# Patient Record
Sex: FEMALE | Race: NATIVE HAWAIIAN OR OTHER PACIFIC ISLANDER | Employment: PART TIME | URBAN - METROPOLITAN AREA
[De-identification: names, ages, dates, MRNs, and addresses within clinical notes are randomized per-mention and may not be internally consistent; named-entity substitution may affect disease eponyms.]

---

## 2018-09-17 ENCOUNTER — OFFICE VISIT (OUTPATIENT)
Dept: FAMILY MEDICINE CLINIC | Age: 43
End: 2018-09-17

## 2018-09-17 VITALS
TEMPERATURE: 98.2 F | WEIGHT: 155 LBS | BODY MASS INDEX: 27.46 KG/M2 | SYSTOLIC BLOOD PRESSURE: 106 MMHG | OXYGEN SATURATION: 99 % | HEIGHT: 63 IN | HEART RATE: 61 BPM | RESPIRATION RATE: 16 BRPM | DIASTOLIC BLOOD PRESSURE: 78 MMHG

## 2018-09-17 DIAGNOSIS — R71.8 ELEVATED MCV: ICD-10-CM

## 2018-09-17 DIAGNOSIS — Z23 ENCOUNTER FOR IMMUNIZATION: ICD-10-CM

## 2018-09-17 DIAGNOSIS — R53.83 FATIGUE, UNSPECIFIED TYPE: ICD-10-CM

## 2018-09-17 DIAGNOSIS — Z13.1 SCREENING FOR DIABETES MELLITUS (DM): ICD-10-CM

## 2018-09-17 DIAGNOSIS — R07.9 CHEST PAIN, UNSPECIFIED TYPE: ICD-10-CM

## 2018-09-17 DIAGNOSIS — Z13.220 LIPID SCREENING: ICD-10-CM

## 2018-09-17 DIAGNOSIS — Z00.00 WELL ADULT EXAM: Primary | ICD-10-CM

## 2018-09-17 DIAGNOSIS — N92.6 IRREGULAR MENSES: ICD-10-CM

## 2018-09-17 DIAGNOSIS — E55.9 VITAMIN D DEFICIENCY: ICD-10-CM

## 2018-09-17 NOTE — PROGRESS NOTES
Subjective:  
39 y.o. female for Well Woman Check. Her gyne and breast care is done elsewhere by her Ob-Gyne physician. Dr. Carlos Lewis Past Medical History:  
Diagnosis Date  Abnormal Pap smear of cervix  Essential hypertension  Headache Past Surgical History:  
Procedure Laterality Date   DELIVERY ONLY    
 HX CYST REMOVAL Right 2003  
 right wrist ganglion cyst  
 
Family History Problem Relation Age of Onset  Hypertension Mother Shaye Arthritis-osteo Mother  Diabetes Mother  Heart Attack Mother  Diabetes Father  Hypertension Father Social History Substance Use Topics  Smoking status: Never Smoker  Smokeless tobacco: Never Used  Alcohol use Yes Comment: occ ROS: Feeling generally well. No TIA's or unusual headaches, no dysphagia. No prolonged cough. No dyspnea or chest pain on exertion. No abdominal pain, change in bowel habits, black or bloody stools. No urinary tract symptoms. No new or unusual musculoskeletal symptoms. Objective: The patient appears well, alert, oriented x 3, in no distress. Visit Vitals  /78 (BP 1 Location: Left arm, BP Patient Position: Sitting)  Pulse 61  Temp 98.2 °F (36.8 °C) (Oral)  Resp 16  
 Ht 5' 3\" (1.6 m)  Wt 155 lb (70.3 kg)  LMP 2018 (Approximate)  SpO2 99%  BMI 27.46 kg/m2 ENT normal.  Neck supple. No adenopathy or thyromegaly. FATOU. Lungs are clear, good air entry, no wheezes, rhonchi or rales. S1 and S2 normal, no murmurs, regular rate and rhythm. Abdomen soft without tenderness, guarding, mass or organomegaly. Extremities show no edema, normal peripheral pulses. Neurological is normal, no focal findings. Breast and Pelvic exams are deferred. Assessment/Plan:  
Diagnoses and all orders for this visit: 
 
Well adult exam 
Well Woman 
lose weight, routine labs ordered, have labs drawn prior to ROV, call if any problems reviewed diet, exercise and weight control Pap smear and mammogram per pt normal w/ dr Benny Kapadia Tdap/flu vaccines today Lipid screening -     LIPID PANEL; Future Screening for diabetes mellitus (DM) 
-     METABOLIC PANEL, COMPREHENSIVE; Future 
-     HEMOGLOBIN A1C W/O EAG; Future Encounter for immunization -     Influenza virus vaccine (QUADRIVALENT PRES FREE SYRINGE) IM ([id number]) -     Tetanus, diphtheria toxoids and acellular pertussis (TDAP) vaccine, in individuals >=7 years, IM Alejandro Germainfast, 39 y.o.,  female SUBJECTIVE Few concerns Fatigue- past month, felt more exhausted than usual. Says no energy throughout the day. Reports increased stress at that time as well. She says had blood test done with gyne, CBC showing mildly elevated MCV. She has h/o vit d def. Chest pain- past month as well. Reports L sided chest pain at rest.  She does cardio, treadmill and rowing without symptoms. She reports mom and brother with history of CAD/ heart attacks. She denies sob, diaphoresis, lightheadedness, heartburn, cough, palpitations. Wants to have her hormones checked, had irregular bleeding past few months. She is seeing gyne, had endometrial bx done. She denies hot flashes, vaginal dryness. ROS: 
See HPI, all others negative Patient Active Problem List  
Diagnosis Code  Advanced directives, counseling/discussion Z71.89  Vitamin D deficiency E55.9 Current Outpatient Prescriptions Medication Sig Dispense Refill  traZODone (DESYREL) 50 mg tablet Take 1 Tab by mouth nightly. 30 Tab 0 Allergies Allergen Reactions  Bactrim [Sulfamethoprim Ds] Hives  Motrin [Ibuprofen] Hives Past Medical History:  
Diagnosis Date  Abnormal Pap smear of cervix  Essential hypertension  Headache Social History Social History  Marital status:    Spouse name: N/A  
 Number of children: N/A  
 Years of education: N/A  
 
 Occupational History  student Social History Main Topics  Smoking status: Never Smoker  Smokeless tobacco: Never Used  Alcohol use Yes Comment: occ  Drug use: No  
 Sexual activity: Yes  
  Partners: Male Birth control/ protection: None Other Topics Concern  Not on file Social History Narrative Family History Problem Relation Age of Onset  Hypertension Mother [facility] Arthritis-osteo Mother  Diabetes Mother  Heart Attack Mother  Diabetes Father  Hypertension Father OBJECTIVE Physical Exam:  
 
Visit Vitals  /78 (BP 1 Location: Left arm, BP Patient Position: Sitting)  Pulse 61  Temp 98.2 °F (36.8 °C) (Oral)  Resp 16  
 Ht 5' 3\" (1.6 m)  Wt 155 lb (70.3 kg)  LMP 09/03/2018 (Approximate)  SpO2 99%  BMI 27.46 kg/m2 General: alert, well-appearing,  in no apparent distress or pain Head: atraumatic. Non-tender maxillary and frontal sinuses Eyes: Lids with no discharge, no matting, conjunctivae clear and non injected, full EOMs, PERLLA Ears: pinna non-tender, external auditory canal patent, TM intact Mouth/throat:tonsils non enlarged, pharynx non erythematous and no lesion, nasal mucosa normal 
Neck: supple, no adenopathy palpated CVS: normal rate, regular rhythm, distinct S1 and S2, +MTTP on left chest area Lungs:clear to ausculation bilaterally, no crackles, wheezing or rhonchi noted Abdomen: normoactive bowel sounds, soft, non-tender Extremities: no edema, no cyanosis, MSK grossly normal 
Skin: warm, no lesions, rashes noted Psych:  mood and affect normal 
 
 
 
ASSESSMENT/PLAN Diagnoses and all orders for this visit: 
 
 Fatigue, unspecified type Nonspecific Normal CBC reviewed, elevated MCV 
-     VITAMIN D, 25 HYDROXY; Future -     VITAMIN B12 & FOLATE; Future -     METABOLIC PANEL, COMPREHENSIVE; Future 
-     TSH 3RD GENERATION; Future Vitamin D deficiency -     VITAMIN D, 25 HYDROXY; Future Elevated MCV 
-     VITAMIN D, 25 HYDROXY; Future -     VITAMIN B12 & FOLATE; Future Lipid screening -     LIPID PANEL; Future Screening for diabetes mellitus (DM) 
-     METABOLIC PANEL, COMPREHENSIVE; Future 
-     HEMOGLOBIN A1C W/O EAG; Future Chest pain, unspecified type Likely atypical, however with strong fam h/o CAD Check cardiac stress test 
-     XR CHEST PA LAT; Future 
-     EKG, 12 LEAD, INITIAL; Future -     EXERCISE CARDIAC STRESS TEST; Future Irregular menses 
-     [facility] AND ; Future Encounter for immunization -     Influenza virus vaccine (QUADRIVALENT PRES FREE SYRINGE) IM ([id number]) -     Tetanus, diphtheria toxoids and acellular pertussis (TDAP) vaccine, in individuals >=7 years, IM Follow-up Disposition: 
Return in about 2 weeks (around 10/1/2018), or if symptoms worsen or fail to improve. Patient understands plan of care. Patient has provided input and agrees with goals.

## 2018-09-17 NOTE — PROGRESS NOTES
1. Have you been to the ER, urgent care clinic since your last visit? Hospitalized since your last visit? No 
 
2. Have you seen or consulted any other health care providers outside of the [address] [place] [address] since your last visit? Include any pap smears or colon screening. Dr. Jaimee Encarnacion Tdap 0.5 ml given IM in left deltoid. Lot # [id number], exp date 02/07/2021. Patient tolerated injection well. No adverse reaction noted. Flulaval 0.5 ml given IM in right deltoid. Lot # [id number], exp date 06/12/2019. Patient tolerated injection well. No adverse reaction noted.

## 2018-09-17 NOTE — PATIENT INSTRUCTIONS

## 2018-09-17 NOTE — MR AVS SNAPSHOT
[facility] [address] 
[phone] Patient: Susu Peraza MRN: [id number] SWA:[id number] Visit Information Date & Time Provider Department Dept. Phone Encounter #  
 9/17/2018  1:00 PM Kimo Roman, [address] [id number] Follow-up Instructions Return in about 2 weeks (around 10/1/2018), or if symptoms worsen or fail to improve. Upcoming Health Maintenance Date Due DTaP/Tdap/Td series (1 - Tdap) 4/15/1995 Influenza Age 5 to Adult 8/1/2018 PAP AKA CERVICAL CYTOLOGY 8/22/2018 Allergies as of 9/17/2018  Review Complete On: 9/17/2018 By: Kb Machado LPN Severity Noted Reaction Type Reactions Bactrim [Sulfamethoprim Ds]  11/04/2011    Hives Motrin [Ibuprofen]  11/04/2011    Hives Current Immunizations  Never Reviewed No immunizations on file. Not reviewed this visit You Were Diagnosed With   
  
 Codes Comments Well adult exam    -  Primary ICD-10-CM: Z00.00 ICD-9-CM: V70.0 Fatigue, unspecified type     ICD-10-CM: R53.83 ICD-9-CM: 780.79 Vitamin D deficiency     ICD-10-CM: E55.9 ICD-9-CM: 268.9 Elevated MCV     ICD-10-CM: R71.8 ICD-9-CM: 790.09 Lipid screening     ICD-10-CM: N96.912 ICD-9-CM: V77.91 Screening for diabetes mellitus (DM)     ICD-10-CM: Z13.1 ICD-9-CM: V77.1 Chest pain, unspecified type     ICD-10-CM: R07.9 ICD-9-CM: 786.50 Irregular menses     ICD-10-CM: N92.6 ICD-9-CM: 626.4 Vitals BP Pulse Temp Resp Height(growth percentile) Weight(growth percentile) 106/78 (BP 1 Location: Left arm, BP Patient Position: Sitting) 61 98.2 °F (36.8 °C) (Oral) 16 5' 3\" (1.6 m) 155 lb (70.3 kg) LMP SpO2 BMI OB Status Smoking Status 09/03/2018 (Approximate) 99% 27.46 kg/m2 Having regular periods Never Smoker Vitals History BMI and BSA Data Body Mass Index Body Surface Area  
 27.46 kg/m 2 1.77 m 2 Preferred Pharmacy Pharmacy Name Phone [facility] [address], [address] [phone] Your Updated Medication List  
  
   
This list is accurate as of 9/17/18  1:59 PM.  Always use your most recent med list.  
  
  
  
  
 traZODone 50 mg tablet Commonly known as:  Barbyoel Frederickse Take 1 Tab by mouth nightly. Follow-up Instructions Return in about 2 weeks (around 10/1/2018), or if symptoms worsen or fail to improve. To-Do List   
 09/17/2018 ECG:  EKG, 12 LEAD, INITIAL   
  
 09/17/2018 NM Stress:  EXERCISE CARDIAC STRESS TEST   
  
 09/17/2018 Lab:  [facility] AND [address]   
  
 09/17/2018 Lab:  HEMOGLOBIN A1C W/O EAG   
  
 09/17/2018 Lab:  LIPID PANEL   
  
 09/17/2018 Lab:  METABOLIC PANEL, COMPREHENSIVE   
  
 09/17/2018 Lab:  TSH 3RD GENERATION   
  
 09/17/2018 Lab:  VITAMIN B12 & FOLATE   
  
 09/17/2018 Lab:  VITAMIN D, 25 HYDROXY   
  
 09/17/2018 Imaging:  XR CHEST PA LAT Patient Instructions Heart-Healthy Diet: Care Instructions Your Care Instructions A heart-healthy diet has lots of vegetables, fruits, nuts, beans, and whole grains, and is low in salt. It limits foods that are high in saturated fat, such as meats, cheeses, and fried foods. It may be hard to change your diet, but even small changes can lower your risk of heart attack and heart disease. Follow-up care is a key part of your treatment and safety. Be sure to make and go to all appointments, and call your doctor if you are having problems. It's also a good idea to know your test results and keep a list of the medicines you take. How can you care for yourself at home? Watch your portions · Learn what a serving is. A \"serving\" and a \"portion\" are not always the same thing.  Make sure that you are not eating larger portions than are recommended. For example, a serving of pasta is ½ cup. A serving size of meat is 2 to 3 ounces. A 3-ounce serving is about the size of a deck of cards. Measure serving sizes until you are good at [facility]" them. Keep in mind that restaurants often serve portions that are 2 or 3 times the size of one serving. · To keep your energy level up and keep you from feeling hungry, eat often but in smaller portions. · Eat only the number of calories you need to stay at a healthy weight. If you need to lose weight, eat fewer calories than your body burns (through exercise and other physical activity). Eat more fruits and vegetables · Eat a variety of fruit and vegetables every day. Dark green, deep orange, red, or yellow fruits and vegetables are especially good for you. Examples include spinach, carrots, peaches, and berries. · Keep carrots, celery, and other veggies handy for snacks. Buy fruit that is in season and store it where you can see it so that you will be tempted to eat it. · Cook dishes that have a lot of veggies in them, such as stir-fries and soups. Limit saturated and trans fat · Read food labels, and try to avoid saturated and trans fats. They increase your risk of heart disease. Trans fat is found in many processed foods such as cookies and crackers. · Use olive or canola oil when you cook. Try cholesterol-lowering spreads, such as Benecol or Take Control. · Bake, broil, grill, or steam foods instead of frying them. · Choose lean meats instead of high-fat meats such as hot dogs and sausages. Cut off all visible fat when you prepare meat. · Eat fish, skinless poultry, and meat alternatives such as soy products instead of high-fat meats. Soy products, such as tofu, may be especially good for your heart. · Choose low-fat or fat-free milk and dairy products. Eat fish · Eat at least two servings of fish a week.  Certain fish, such as salmon and tuna, contain omega-3 fatty acids, which may help reduce your risk of heart attack. Eat foods high in fiber · Eat a variety of grain products every day. Include whole-grain foods that have lots of fiber and nutrients. Examples of whole-grain foods include oats, whole wheat bread, and brown rice. · Buy whole-grain breads and cereals, instead of white bread or pastries. Limit salt and sodium · Limit how much salt and sodium you eat to help lower your blood pressure. · Taste food before you salt it. Add only a little salt when you think you need it. With time, your taste buds will adjust to less salt. · Eat fewer snack items, fast foods, and other high-salt, processed foods. Check food labels for the amount of sodium in packaged foods. · Choose low-sodium versions of canned goods (such as soups, vegetables, and beans). Limit sugar · Limit drinks and foods with added sugar. These include candy, desserts, and soda pop. Limit alcohol · Limit alcohol to no more than 2 drinks a day for men and 1 drink a day for women. Too much alcohol can cause health problems. When should you call for help? Watch closely for changes in your health, and be sure to contact your doctor if: 
  · You would like help planning heart-healthy meals. Where can you learn more? Go to http://brandy-hero.info/. Enter V137 in the search box to learn more about \"Heart-Healthy Diet: Care Instructions. \" Current as of: December 6, 2017 Content Version: 11.7 © 9319-5811 [employer] [place]. Care instructions adapted under license by [employer] (which disclaims liability or warranty for this information). If you have questions about a medical condition or this instruction, always ask your healthcare professional. [place] [address] any warranty or liability for your use of this information. Introducing [facility]! [employer] introduces SafeStore patient portal. Now you can access parts of your medical record, email your doctor's office, and request medication refills online. 1. In your internet browser, go to [email or web address] 2. Click on the First Time User? Click Here link in the Sign In box. You will see the New Member Sign Up page. 3. Enter your SafeStore Access Code exactly as it appears below. You will not need to use this code after youve completed the sign-up process. If you do not sign up before the expiration date, you must request a new code. · SafeStore Access Code: [email or web address] Expires: 12/10/2018  3:09 PM 
 
4. Enter the last four digits of your Social Security Number (xxxx) and Date of Birth (mm/dd/yyyy) as indicated and click Submit. You will be taken to the next sign-up page. 5. Create a SafeStore ID. This will be your SafeStore login ID and cannot be changed, so think of one that is secure and easy to remember. 6. Create a SafeStore password. You can change your password at any time. 7. Enter your Password Reset Question and Answer. This can be used at a later time if you forget your password. 8. Enter your e-mail address. You will receive e-mail notification when new information is available in [address] 9. Click Sign Up. You can now view and download portions of your medical record. 10. Click the Download Summary menu link to download a portable copy of your medical information. If you have questions, please visit the Frequently Asked Questions section of the SafeStore website. Remember, SafeStore is NOT to be used for urgent needs. For medical emergencies, dial 911. Now available from your iPhone and Android! Please provide this summary of care documentation to your next provider. Your primary care clinician is listed as Ronnie Ma. If you have any questions after today's visit, please call [phone].

## 2018-09-18 ENCOUNTER — HOSPITAL ENCOUNTER (OUTPATIENT)
Dept: GENERAL RADIOLOGY | Age: 43
Discharge: HOME | End: 2018-09-18
Payer: COMMERCIAL

## 2018-09-18 ENCOUNTER — HOSPITAL ENCOUNTER (OUTPATIENT)
Dept: LAB | Age: 43
Discharge: HOME | End: 2018-09-18
Payer: COMMERCIAL

## 2018-09-18 DIAGNOSIS — R07.9 CHEST PAIN, UNSPECIFIED TYPE: ICD-10-CM

## 2018-09-18 DIAGNOSIS — N92.6 IRREGULAR MENSES: ICD-10-CM

## 2018-09-18 DIAGNOSIS — Z13.220 LIPID SCREENING: ICD-10-CM

## 2018-09-18 DIAGNOSIS — Z13.1 SCREENING FOR DIABETES MELLITUS (DM): ICD-10-CM

## 2018-09-18 DIAGNOSIS — R71.8 ELEVATED MCV: ICD-10-CM

## 2018-09-18 DIAGNOSIS — R53.83 FATIGUE, UNSPECIFIED TYPE: ICD-10-CM

## 2018-09-18 DIAGNOSIS — E55.9 VITAMIN D DEFICIENCY: ICD-10-CM

## 2018-09-18 LAB
25(OH)D3 SERPL-MCNC: 14.8 NG/ML (ref 30–100)
ALBUMIN SERPL-MCNC: 3.8 G/DL (ref 3.4–5)
ALBUMIN/GLOB SERPL: 1.1 {RATIO} (ref 0.8–1.7)
ALP SERPL-CCNC: 66 U/L (ref 45–117)
ALT SERPL-CCNC: 31 U/L (ref 13–56)
ANION GAP SERPL CALC-SCNC: 6 MMOL/L (ref 3–18)
AST SERPL-CCNC: 27 U/L (ref 15–37)
ATRIAL RATE: 50 BPM
BILIRUB SERPL-MCNC: 0.9 MG/DL (ref 0.2–1)
BUN SERPL-MCNC: 13 MG/DL (ref 7–18)
BUN/CREAT SERPL: 13 (ref 12–20)
CALCIUM SERPL-MCNC: 8.4 MG/DL (ref 8.5–10.1)
CALCULATED P AXIS, ECG09: 55 DEGREES
CALCULATED R AXIS, ECG10: 40 DEGREES
CALCULATED T AXIS, ECG11: 11 DEGREES
CHLORIDE SERPL-SCNC: 105 MMOL/L (ref 100–108)
CHOLEST SERPL-MCNC: 162 MG/DL
CO2 SERPL-SCNC: 28 MMOL/L (ref 21–32)
CREAT SERPL-MCNC: 0.97 MG/DL (ref 0.6–1.3)
DIAGNOSIS, 93000: NORMAL
FOLATE SERPL-MCNC: >20 NG/ML (ref 3.1–17.5)
GLOBULIN SER CALC-MCNC: 3.6 G/DL (ref 2–4)
GLUCOSE SERPL-MCNC: 92 MG/DL (ref 74–99)
HBA1C MFR BLD: 5.4 % (ref 4.2–5.6)
HDLC SERPL-MCNC: 56 MG/DL (ref 40–60)
HDLC SERPL: 2.9 {RATIO} (ref 0–5)
LDLC SERPL CALC-MCNC: 86.4 MG/DL (ref 0–100)
LIPID PROFILE,FLP: NORMAL
P-R INTERVAL, ECG05: 190 MS
POTASSIUM SERPL-SCNC: 4 MMOL/L (ref 3.5–5.5)
PROT SERPL-MCNC: 7.4 G/DL (ref 6.4–8.2)
Q-T INTERVAL, ECG07: 436 MS
QRS DURATION, ECG06: 90 MS
QTC CALCULATION (BEZET), ECG08: 397 MS
SODIUM SERPL-SCNC: 139 MMOL/L (ref 136–145)
TRIGL SERPL-MCNC: 98 MG/DL (ref ?–150)
TSH SERPL DL<=0.05 MIU/L-ACNC: 1.11 UIU/ML (ref 0.36–3.74)
VENTRICULAR RATE, ECG03: 50 BPM
VIT B12 SERPL-MCNC: 555 PG/ML (ref 211–911)
VLDLC SERPL CALC-MCNC: 19.6 MG/DL

## 2018-09-18 PROCEDURE — 80061 LIPID PANEL: CPT | Performed by: FAMILY MEDICINE

## 2018-09-18 PROCEDURE — 80053 COMPREHEN METABOLIC PANEL: CPT | Performed by: FAMILY MEDICINE

## 2018-09-18 PROCEDURE — 83036 HEMOGLOBIN GLYCOSYLATED A1C: CPT | Performed by: FAMILY MEDICINE

## 2018-09-18 PROCEDURE — 71046 X-RAY EXAM CHEST 2 VIEWS: CPT

## 2018-09-18 PROCEDURE — 84443 ASSAY THYROID STIM HORMONE: CPT | Performed by: FAMILY MEDICINE

## 2018-09-18 PROCEDURE — 36415 COLL VENOUS BLD VENIPUNCTURE: CPT | Performed by: FAMILY MEDICINE

## 2018-09-18 PROCEDURE — 93005 ELECTROCARDIOGRAM TRACING: CPT

## 2018-09-18 PROCEDURE — 82306 VITAMIN D 25 HYDROXY: CPT | Performed by: FAMILY MEDICINE

## 2018-09-18 PROCEDURE — 82607 VITAMIN B-12: CPT | Performed by: FAMILY MEDICINE

## 2018-09-18 PROCEDURE — 83001 ASSAY OF GONADOTROPIN (FSH): CPT | Performed by: FAMILY MEDICINE

## 2018-09-18 NOTE — PROGRESS NOTES
Contacted patient and verified identity using name and date of birth (2- identifiers). Patient advised normal cxr. Patient voiced understanding.

## 2018-09-19 ENCOUNTER — TELEPHONE (OUTPATIENT)
Dept: FAMILY MEDICINE CLINIC | Age: 43
End: 2018-09-19

## 2018-09-20 LAB
FSH SERPL-ACNC: 2.7 MIU/ML
LH SERPL-ACNC: 1.9 MIU/ML

## 2018-09-24 ENCOUNTER — HOSPITAL ENCOUNTER (OUTPATIENT)
Dept: NON INVASIVE DIAGNOSTICS | Age: 43
Discharge: HOME | End: 2018-09-24
Attending: FAMILY MEDICINE
Payer: COMMERCIAL

## 2018-09-24 VITALS
BODY MASS INDEX: 27.29 KG/M2 | WEIGHT: 154 LBS | HEIGHT: 63 IN | DIASTOLIC BLOOD PRESSURE: 84 MMHG | SYSTOLIC BLOOD PRESSURE: 142 MMHG

## 2018-09-24 DIAGNOSIS — R07.9 CHEST PAIN, UNSPECIFIED TYPE: ICD-10-CM

## 2018-09-24 LAB
STRESS ANGINA INDEX: 0
STRESS BASELINE DIAS BP: 84 MMHG
STRESS BASELINE HR: 72 BPM
STRESS BASELINE SYS BP: 142 MMHG
STRESS ESTIMATED WORKLOAD: 14.4 METS
STRESS EXERCISE DUR MIN: NORMAL
STRESS PEAK DIAS BP: 80 MMHG
STRESS PEAK SYS BP: 188 MMHG
STRESS PERCENT HR ACHIEVED: 102 %
STRESS POST PEAK HR: 153 BPM
STRESS RATE PRESSURE PRODUCT: NORMAL BPM*MMHG
STRESS ST DEPRESSION: 0 MM
STRESS ST ELEVATION: 0 MM
STRESS STAGE 1 BP: NORMAL MMHG
STRESS STAGE 1 DURATION: 3 MIN:SEC
STRESS STAGE 1 HR: 93 BPM
STRESS STAGE 2 BP: NORMAL MMHG
STRESS STAGE 2 DURATION: 3 MIN:SEC
STRESS STAGE 2 HR: 106 BPM
STRESS STAGE 3 BP: NORMAL MMHG
STRESS STAGE 3 DURATION: 3 MIN:SEC
STRESS STAGE 3 HR: 122 BPM
STRESS STAGE 4 DURATION: 3 MIN:SEC
STRESS STAGE 4 HR: 151 BPM
STRESS STAGE 5 DURATION: NORMAL MIN:SEC
STRESS STAGE 5 HR: 153 BPM
STRESS STAGE RECOVERY 1 BP: NORMAL MMHG
STRESS STAGE RECOVERY 1 DURATION: 2 MIN:SEC
STRESS STAGE RECOVERY 1 HR: 103 BPM
STRESS STAGE RECOVERY 2 BP: NORMAL MMHG
STRESS STAGE RECOVERY 2 DURATION: 4 MIN:SEC
STRESS STAGE RECOVERY 2 HR: 85 BPM
STRESS TARGET HR: 150 BPM

## 2018-09-24 PROCEDURE — 93017 CV STRESS TEST TRACING ONLY: CPT

## 2018-09-27 NOTE — PROGRESS NOTES
Patient identified with 2 identifiers (name and ). PATEINT AWARE OF Negative cardiac stress test, this is reassuring Will discuss further on next appt

## 2018-10-01 ENCOUNTER — OFFICE VISIT (OUTPATIENT)
Dept: FAMILY MEDICINE CLINIC | Age: 43
End: 2018-10-01

## 2018-10-01 VITALS
OXYGEN SATURATION: 99 % | TEMPERATURE: 98.3 F | RESPIRATION RATE: 16 BRPM | BODY MASS INDEX: 26.75 KG/M2 | HEART RATE: 66 BPM | WEIGHT: 151 LBS | DIASTOLIC BLOOD PRESSURE: 78 MMHG | SYSTOLIC BLOOD PRESSURE: 122 MMHG | HEIGHT: 63 IN

## 2018-10-01 DIAGNOSIS — E55.9 VITAMIN D DEFICIENCY: ICD-10-CM

## 2018-10-01 DIAGNOSIS — R53.83 FATIGUE, UNSPECIFIED TYPE: ICD-10-CM

## 2018-10-01 DIAGNOSIS — R07.89 ATYPICAL CHEST PAIN: Primary | ICD-10-CM

## 2018-10-01 NOTE — PROGRESS NOTES
Khoa Villalta, 39 y.o.,  female SUBJECTIVE Ff-up Fatigue-reviewed labs noted Vit D deficiency. Reports some improvement of energy, thinks perhaps stress may have contributed. Chest pain- past month as well. Reports L sided chest pain at rest.  She does cardio, treadmill and rowing without symptoms. She reports mom and brother with history of CAD/ heart attacks. She denies sob, diaphoresis, lightheadedness, heartburn, cough, palpitations Reviewed cardiac stress test neg. Lipid/dm screen normal.   
 
 
 
ROS: 
See HPI, all others negative Patient Active Problem List  
Diagnosis Code  Advanced directives, counseling/discussion Z71.89  Vitamin D deficiency E55.9 Current Outpatient Prescriptions Medication Sig Dispense Refill  traZODone (DESYREL) 50 mg tablet Take 1 Tab by mouth nightly. 30 Tab 0 Allergies Allergen Reactions  Bactrim [Sulfamethoprim Ds] Hives  Motrin [Ibuprofen] Hives Past Medical History:  
Diagnosis Date  Abnormal Pap smear of cervix  Essential hypertension  Headache Social History Social History  Marital status:  Spouse name: N/A  
 Number of children: N/A  
 Years of education: N/A Occupational History  student Social History Main Topics  Smoking status: Never Smoker  Smokeless tobacco: Never Used  Alcohol use Yes Comment: occ  Drug use: No  
 Sexual activity: Yes  
  Partners: Male Birth control/ protection: None Other Topics Concern  Not on file Social History Narrative Family History Problem Relation Age of Onset  Hypertension Mother Didymus.Nova Arthritis-osteo Mother  Diabetes Mother  Heart Attack Mother  Diabetes Father  Hypertension Father OBJECTIVE Physical Exam:  
 
Visit Vitals  /78 (BP 1 Location: Left arm, BP Patient Position: Sitting)  Pulse 66  Temp 98.3 °F (36.8 °C) (Oral)  Resp 16  
   5' 3\" (1.6 m)  Wt 151 lb (68.5 kg)  LMP 09/03/2018 (Approximate)  SpO2 99%  BMI 26.75 kg/m2 General: alert, well-appearing,  in no apparent distress or pain Neck: supple, no adenopathy palpated CVS: normal rate, regular rhythm, distinct S1 and S2, +MTTP on left chest area Lungs:clear to ausculation bilaterally, no crackles, wheezing or rhonchi noted Abdomen: normoactive bowel sounds, soft, non-tender Extremities: no edema, no cyanosis, MSK grossly normal 
Skin: warm, no lesions, rashes noted Psych:  mood and affect normal 
 
Results for orders placed or performed during the hospital encounter of 09/24/18 EXERCISE CARDIAC STRESS TEST Result Value Ref Range Exercise duration time 00:12:16 Stress Base Systolic  mmHg Stress Base Diastolic BP 80 mmHg Post peak  BPM  
 Baseline HR 72 BPM  
 Estimated workload 14.4 METS Percent  % Stress Rate Pressure Product 96662 BPM*mmHg Target  bpm  
 ST Elevation (mm) 0 mm ST Depression (mm) 0 mm Angina Index 0 Baseline  mmHg Stress Base Diastolic BP 84 mmHg Stress Stage 1 Duration 3 min:sec Stress Stage 1 HR 93 bpm  
 Stress Stage 1 /80 mmHg Stress Stage 2 Duration 3 min:sec Stress Stage 2  bpm  
 Stress Stage 2 /80 mmHg Stress Stage 3 Duration 3 min:sec Stress Stage 3  bpm  
 Stress Stage 3 /78 mmHg Stress Stage 4 Duration 3 min:sec Stress Stage 4  bpm  
 Stress Stage 5 Duration :16 min:sec Stress Stage 5  bpm  
 Recovery Stage 1 Duration 2 min:sec Recovery Stage 1  bpm  
 Recovery Stage 1 /80 mmHg Recovery Stage 2 HR 85 bpm  
 Recovery Stage 2 /82 mmHg Recovery Stage 2 Duration 4 min:sec ASSESSMENT/PLAN Diagnoses and all orders for this visit: 
 
Atypical chest pain Reproducible, likely MSK, advised stretching prior to work outs Cardiac stress test negative No other risk factors aside from fam h/o Monitoring Fatigue, unspecified type Vitamin D deficiency Replete 50 k weekly x 3 months Will monitor Follow-up Disposition: 
Return in about 3 months or sooner prn Patient understands plan of care. Patient has provided input and agrees with goals.

## 2018-10-01 NOTE — PATIENT INSTRUCTIONS

## 2018-10-01 NOTE — PROGRESS NOTES
1. Have you been to the ER, urgent care clinic since your last visit? Hospitalized since your last visit? No 
 
2. Have you seen or consulted any other health care providers outside of the [address] [place] [address] since your last visit? Include any pap smears or colon screening.  No

## 2018-10-01 NOTE — MR AVS SNAPSHOT
[address] 
[phone] Patient: Jazmín Baron MRN: [id number] Visit Information Date & Time Provider Department Dept. Phone Encounter #  
 10/1/2018 10:30 AM Ermasherita Lillie, [address] [id number] Follow-up Instructions Return in about 3 months (around 1/1/2019), or if symptoms worsen or fail to improve. Upcoming Health Maintenance Date Due  
 PAP AKA CERVICAL CYTOLOGY 8/22/2018 DTaP/Tdap/Td series (2 - Td) 9/17/2028 Allergies as of 10/1/2018  Review Complete On: 10/1/2018 By: Ova Nail, LPN Severity Noted Reaction Type Reactions Bactrim [Sulfamethoprim Ds]  11/04/2011    Hives Motrin [Ibuprofen]  11/04/2011    Hives Current Immunizations  Reviewed on 9/17/2018 Name Date Influenza Vaccine (Quad) PF 9/17/2018 Tdap 9/17/2018 Not reviewed this visit You Were Diagnosed With   
  
 Codes Comments Vitamin D deficiency    -  Primary ICD-10-CM: E55.9 ICD-9-CM: 268.9 Atypical chest pain     ICD-10-CM: R07.89 ICD-9-CM: 786.59 Vitals BP Pulse Temp Resp Height(growth percentile) Weight(growth percentile) 122/78 (BP 1 Location: Left arm, BP Patient Position: Sitting) 66 98.3 °F (36.8 °C) (Oral) 16 5' 3\" (1.6 m) 151 lb (68.5 kg) LMP SpO2 BMI OB Status Smoking Status 09/03/2018 (Approximate) 99% 26.75 kg/m2 Having regular periods Never Smoker Vitals History BMI and BSA Data Body Mass Index Body Surface Area  
 26.75 kg/m 2 1.74 m 2 Preferred Pharmacy Pharmacy Name Phone [facility] [address], [address] [phone] Your Updated Medication List  
  
   
This list is accurate as of 10/1/18 10:51 AM.  Always use your most recent med list.  
  
  
  
  
 cholecalciferol (vitamin D3) 50,000 unit Tab Take 1 tablet once a week. Prescriptions Sent to Pharmacy Refills  
 cholecalciferol, vitamin D3, 50,000 unit tab 0 Sig: Take 1 tablet once a week. Class: Normal  
 Pharmacy: [facility] nydia, [address] [place] [address] #: [phone] Follow-up Instructions Return in about 3 months (around 1/1/2019), or if symptoms worsen or fail to improve. Patient Instructions Heart-Healthy Diet: Care Instructions Your Care Instructions A heart-healthy diet has lots of vegetables, fruits, nuts, beans, and whole grains, and is low in salt. It limits foods that are high in saturated fat, such as meats, cheeses, and fried foods. It may be hard to change your diet, but even small changes can lower your risk of heart attack and heart disease. Follow-up care is a key part of your treatment and safety. Be sure to make and go to all appointments, and call your doctor if you are having problems. It's also a good idea to know your test results and keep a list of the medicines you take. How can you care for yourself at home? Watch your portions · Learn what a serving is. A \"serving\" and a \"portion\" are not always the same thing. Make sure that you are not eating larger portions than are recommended. For example, a serving of pasta is ½ cup. A serving size of meat is 2 to 3 ounces. A 3-ounce serving is about the size of a deck of cards. Measure serving sizes until you are good at [place]" them. Keep in mind that restaurants often serve portions that are 2 or 3 times the size of one serving. · To keep your energy level up and keep you from feeling hungry, eat often but in smaller portions. · Eat only the number of calories you need to stay at a healthy weight. If you need to lose weight, eat fewer calories than your body burns (through exercise and other physical activity). Eat more fruits and vegetables · Eat a variety of fruit and vegetables every day. Dark green, deep orange, red, or yellow fruits and vegetables are especially good for you. Examples include spinach, carrots, peaches, and berries. · Keep carrots, celery, and other veggies handy for snacks. Buy fruit that is in season and store it where you can see it so that you will be tempted to eat it. · Cook dishes that have a lot of veggies in them, such as stir-fries and soups. Limit saturated and trans fat · Read food labels, and try to avoid saturated and trans fats. They increase your risk of heart disease. Trans fat is found in many processed foods such as cookies and crackers. · Use olive or canola oil when you cook. Try cholesterol-lowering spreads, such as Benecol or Take Control. · Bake, broil, grill, or steam foods instead of frying them. · Choose lean meats instead of high-fat meats such as hot dogs and sausages. Cut off all visible fat when you prepare meat. · Eat fish, skinless poultry, and meat alternatives such as soy products instead of high-fat meats. Soy products, such as tofu, may be especially good for your heart. · Choose low-fat or fat-free milk and dairy products. Eat fish · Eat at least two servings of fish a week. Certain fish, such as salmon and tuna, contain omega-3 fatty acids, which may help reduce your risk of heart attack. Eat foods high in fiber · Eat a variety of grain products every day. Include whole-grain foods that have lots of fiber and nutrients. Examples of whole-grain foods include oats, whole wheat bread, and brown rice. · Buy whole-grain breads and cereals, instead of white bread or pastries. Limit salt and sodium · Limit how much salt and sodium you eat to help lower your blood pressure. · Taste food before you salt it. Add only a little salt when you think you need it. With time, your taste buds will adjust to less salt. · Eat fewer snack items, fast foods, and other high-salt, processed foods. Check food labels for the amount of sodium in packaged foods. · Choose low-sodium versions of canned goods (such as soups, vegetables, and beans). Limit sugar · Limit drinks and foods with added sugar. These include candy, desserts, and soda pop. Limit alcohol · Limit alcohol to no more than 2 drinks a day for men and 1 drink a day for women. Too much alcohol can cause health problems. When should you call for help? Watch closely for changes in your health, and be sure to contact your doctor if: 
  · You would like help planning heart-healthy meals. Where can you learn more? Go to http://brandy-hero.info/. Enter V137 in the search box to learn more about \"Heart-Healthy Diet: Care Instructions. \" Current as of: December 6, 2017 Content Version: 11.7 © 7059-2165 [employer]. Care instructions adapted under license by [employer] (which disclaims liability or warranty for this information). If you have questions about a medical condition or this instruction, always ask your healthcare professional. [address] any warranty or liability for your use of this information. Introducing [facility]! [facility] introduces [employer] patient portal. Now you can access parts of your medical record, email your doctor's office, and request medication refills online. 1. In your internet browser, go to [email or web address] 2. Click on the First Time User? Click Here link in the Sign In box. You will see the New Member Sign Up page. 3. Enter your [employer] Access Code exactly as it appears below. You will not need to use this code after youve completed the sign-up process. If you do not sign up before the expiration date, you must request a new code. · [employer] Access Code: [email or web address] Expires: 12/10/2018  3:09 PM 
 
4.  Enter the last four digits of your Social Security Number (xxxx) and Date of Birth (mm/dd/yyyy) as indicated and click Submit. You will be taken to the next sign-up page. 5. Create a [employer] ID. This will be your [employer] login ID and cannot be changed, so think of one that is secure and easy to remember. 6. Create a [employer] password. You can change your password at any time. 7. Enter your Password Reset Question and Answer. This can be used at a later time if you forget your password. 8. Enter your e-mail address. You will receive e-mail notification when new information is available in [address] 9. Click Sign Up. You can now view and download portions of your medical record. 10. Click the Download Summary menu link to download a portable copy of your medical information. If you have questions, please visit the Frequently Asked Questions section of the [employer] website. Remember, [employer] is NOT to be used for urgent needs. For medical emergencies, dial 911. Now available from your iPhone and Android! Please provide this summary of care documentation to your next provider. Your primary care clinician is listed as Ivan Hayes. If you have any questions after today's visit, please call [phone].

## 2021-01-03 LAB — SARS-COV-2, NAA: NOT DETECTED
